# Patient Record
Sex: FEMALE | Race: WHITE | NOT HISPANIC OR LATINO | URBAN - METROPOLITAN AREA
[De-identification: names, ages, dates, MRNs, and addresses within clinical notes are randomized per-mention and may not be internally consistent; named-entity substitution may affect disease eponyms.]

---

## 2023-04-26 ENCOUNTER — WEB ENCOUNTER (OUTPATIENT)
Dept: URBAN - METROPOLITAN AREA SURGERY CENTER 31 | Facility: SURGERY CENTER | Age: 58
End: 2023-04-26

## 2023-05-02 ENCOUNTER — OFFICE VISIT (OUTPATIENT)
Dept: URBAN - METROPOLITAN AREA SURGERY CENTER 31 | Facility: SURGERY CENTER | Age: 58
End: 2023-05-02
Payer: COMMERCIAL

## 2023-05-02 DIAGNOSIS — Z86.010 COLON POLYP HISTORY: ICD-10-CM

## 2023-05-02 PROCEDURE — G0105 COLORECTAL SCRN; HI RISK IND: HCPCS | Performed by: INTERNAL MEDICINE

## 2023-05-02 PROCEDURE — G8907 PT DOC NO EVENTS ON DISCHARG: HCPCS | Performed by: INTERNAL MEDICINE

## 2023-08-30 ENCOUNTER — WEB ENCOUNTER (OUTPATIENT)
Dept: URBAN - METROPOLITAN AREA CLINIC 19 | Facility: CLINIC | Age: 58
End: 2023-08-30

## 2023-08-30 ENCOUNTER — OFFICE VISIT (OUTPATIENT)
Dept: URBAN - METROPOLITAN AREA CLINIC 19 | Facility: CLINIC | Age: 58
End: 2023-08-30
Payer: COMMERCIAL

## 2023-08-30 ENCOUNTER — LAB OUTSIDE AN ENCOUNTER (OUTPATIENT)
Dept: URBAN - METROPOLITAN AREA CLINIC 19 | Facility: CLINIC | Age: 58
End: 2023-08-30

## 2023-08-30 VITALS
SYSTOLIC BLOOD PRESSURE: 148 MMHG | HEART RATE: 60 BPM | BODY MASS INDEX: 33.31 KG/M2 | DIASTOLIC BLOOD PRESSURE: 67 MMHG | HEIGHT: 63 IN | OXYGEN SATURATION: 100 % | WEIGHT: 188 LBS | TEMPERATURE: 98.4 F

## 2023-08-30 DIAGNOSIS — R49.0 HOARSENESS: ICD-10-CM

## 2023-08-30 DIAGNOSIS — R09.89 GLOBUS SENSATION: ICD-10-CM

## 2023-08-30 DIAGNOSIS — R13.19 ESOPHAGEAL DYSPHAGIA: ICD-10-CM

## 2023-08-30 DIAGNOSIS — R05.3 CHRONIC COUGH: ICD-10-CM

## 2023-08-30 PROCEDURE — 99213 OFFICE O/P EST LOW 20 MIN: CPT | Performed by: NURSE PRACTITIONER

## 2023-08-30 RX ORDER — TIRZEPATIDE 7.5 MG/.5ML
AS DIRECTED INJECTION, SOLUTION SUBCUTANEOUS
Status: ACTIVE | COMMUNITY

## 2023-08-30 NOTE — HPI-TODAY'S VISIT:
Ms. Flower is a 59 yo female with PMH of IDDM, HTN/HLD, colon polyps who presents today for c/o dysphagia. Sent upon referral from Dr. Migdalia Huffman.  A copy of this report will be sent to the referring provider.   Onset 3 months ago. Feeling like food stuck in her upper esophagus. Solids and liquids both. Reports she constantly feels as if something is there even without eating. Reports some chronic coughing and hoarseness. Denies reflux, only occasional heartburn/indigestion. No abdominal pain.  She states Dr. Huffman started her on Pantoprazole but she has not really started taking it.  No changes in bowel habits. She is on Monjouro which causes her occasional constipation but this is relieved with a stool softener. No bloody or black stools.   Not on any prescribed blood thinners. No pacemaker or stents. No pulmonary or kidney disease...  Prior procedures: Colonoscopy done by Dr. Lynne 5/2/2023-entire colon appeared normal.  5-year follow-up given

## 2023-10-16 ENCOUNTER — OFFICE VISIT (OUTPATIENT)
Dept: URBAN - METROPOLITAN AREA CLINIC 19 | Facility: CLINIC | Age: 58
End: 2023-10-16

## 2023-10-17 ENCOUNTER — OFFICE VISIT (OUTPATIENT)
Dept: URBAN - METROPOLITAN AREA CLINIC 19 | Facility: CLINIC | Age: 58
End: 2023-10-17

## 2023-11-03 ENCOUNTER — OFFICE VISIT (OUTPATIENT)
Dept: URBAN - METROPOLITAN AREA SURGERY CENTER 31 | Facility: SURGERY CENTER | Age: 58
End: 2023-11-03
Payer: COMMERCIAL

## 2023-11-03 ENCOUNTER — TELEPHONE ENCOUNTER (OUTPATIENT)
Dept: URBAN - METROPOLITAN AREA CLINIC 19 | Facility: CLINIC | Age: 58
End: 2023-11-03

## 2023-11-03 ENCOUNTER — LAB OUTSIDE AN ENCOUNTER (OUTPATIENT)
Dept: URBAN - METROPOLITAN AREA CLINIC 19 | Facility: CLINIC | Age: 58
End: 2023-11-03

## 2023-11-03 DIAGNOSIS — K63.5 BENIGN COLONIC POLYP: ICD-10-CM

## 2023-11-03 DIAGNOSIS — R13.10 ABNORMAL DEGLUTITION: ICD-10-CM

## 2023-11-03 DIAGNOSIS — R09.89 GLOBUS SENSATION: ICD-10-CM

## 2023-11-03 DIAGNOSIS — Z12.11 COLON CANCER SCREENING (HIGH RISK): ICD-10-CM

## 2023-11-03 DIAGNOSIS — D13.0 BENIGN NEOPLASM OF CERVICAL ESOPHAGUS: ICD-10-CM

## 2023-11-03 DIAGNOSIS — B37.81 CANDIDA: ICD-10-CM

## 2023-11-03 DIAGNOSIS — R13.10 DYSPHAGIA: ICD-10-CM

## 2023-11-03 PROCEDURE — 43239 EGD BIOPSY SINGLE/MULTIPLE: CPT | Performed by: INTERNAL MEDICINE

## 2023-11-03 PROCEDURE — 00731 ANES UPR GI NDSC PX NOS: CPT | Performed by: NURSE ANESTHETIST, CERTIFIED REGISTERED

## 2023-11-03 PROCEDURE — G8907 PT DOC NO EVENTS ON DISCHARG: HCPCS | Performed by: INTERNAL MEDICINE

## 2023-11-03 RX ORDER — FLUCONAZOLE 200 MG/1
1 TABLET TABLET ORAL DAILY
Qty: 14 TABLET | Refills: 0 | OUTPATIENT
Start: 2023-11-03 | End: 2023-11-16

## 2023-11-03 RX ORDER — TIRZEPATIDE 7.5 MG/.5ML
AS DIRECTED INJECTION, SOLUTION SUBCUTANEOUS
Status: ACTIVE | COMMUNITY

## 2023-11-06 ENCOUNTER — TELEPHONE ENCOUNTER (OUTPATIENT)
Dept: URBAN - METROPOLITAN AREA CLINIC 19 | Facility: CLINIC | Age: 58
End: 2023-11-06

## 2023-11-17 ENCOUNTER — OFFICE VISIT (OUTPATIENT)
Dept: URBAN - METROPOLITAN AREA CLINIC 19 | Facility: CLINIC | Age: 58
End: 2023-11-17

## 2023-11-17 RX ORDER — TIRZEPATIDE 7.5 MG/.5ML
AS DIRECTED INJECTION, SOLUTION SUBCUTANEOUS
Status: ACTIVE | COMMUNITY

## 2023-11-24 ENCOUNTER — LAB OUTSIDE AN ENCOUNTER (OUTPATIENT)
Dept: URBAN - METROPOLITAN AREA CLINIC 19 | Facility: CLINIC | Age: 58
End: 2023-11-24

## 2023-11-27 ENCOUNTER — CLAIMS CREATED FROM THE CLAIM WINDOW (OUTPATIENT)
Dept: URBAN - METROPOLITAN AREA CLINIC 19 | Facility: CLINIC | Age: 58
End: 2023-11-27
Payer: COMMERCIAL

## 2023-11-27 ENCOUNTER — LAB OUTSIDE AN ENCOUNTER (OUTPATIENT)
Dept: URBAN - METROPOLITAN AREA CLINIC 19 | Facility: CLINIC | Age: 58
End: 2023-11-27

## 2023-11-27 VITALS
DIASTOLIC BLOOD PRESSURE: 66 MMHG | TEMPERATURE: 97.9 F | HEIGHT: 63 IN | BODY MASS INDEX: 30.65 KG/M2 | WEIGHT: 173 LBS | SYSTOLIC BLOOD PRESSURE: 136 MMHG | HEART RATE: 78 BPM

## 2023-11-27 DIAGNOSIS — R10.11 RUQ PAIN: ICD-10-CM

## 2023-11-27 DIAGNOSIS — R13.12 OROPHARYNGEAL DYSPHAGIA: ICD-10-CM

## 2023-11-27 PROCEDURE — 99214 OFFICE O/P EST MOD 30 MIN: CPT | Performed by: NURSE PRACTITIONER

## 2023-11-27 RX ORDER — TIRZEPATIDE 7.5 MG/.5ML
AS DIRECTED INJECTION, SOLUTION SUBCUTANEOUS
Status: ACTIVE | COMMUNITY

## 2023-11-27 NOTE — HPI-TODAY'S VISIT:
Ms. Flower is a 57 yo female with PMH of IDDM, HTN/HLD, colon polyps who presents today for follow-up. Last seen in GI clinic 8/30/2023 for complaints of dysphagia after being sent upon referral from Dr. Migdalia Huffman.   Onset of symptoms 3 months ago. Feeling like food stuck in her upper esophagus. Solids and liquids both. Reported globus sensation with some chronic coughing and hoarseness. Denied reflux, only occasional heartburn/indigestion. No abdominal pain. Reported Dr. Huffman started her on Pantoprazole but she did not take it. Reported being on Monjouro which causes her occasional constipation which is relieved with a stool softener.       EGD was done by Dr. Lynne on 11/3/2023-esophageal plaques were found suspicious for candidiasis, esophageal polyp 3 mm was found, large amount of food residue in the stomach therefore dilation not attempted, normal duodenum Diflucan 200 mg daily for 2 weeks prescribed Path from proximal esophagus biopsy showed slight chronic inflammation and Candida.  Esophagus at 20 cm polypectomy was inflamed squamous papilloma negative for intestinal metaplasia or fungal elements; no eosinophils 11/24/2023 GES was normal  States she tried to go off Mounjouro for a week but her BG were very out of control so she got back on it.  She reports later that night she ate 2 small chicken wings and then in middle of the night felt like she was having a heart attack so went to the ER at [facility]. US was negative. CBC unremarkable. ALT 43, AST 48. Lipase normal 58.       Prior procedures:        Colonoscopy done by Dr. Lynne 5/2/2023-entire colon appeared normal. 5-year follow-up given..

## 2023-12-05 ENCOUNTER — TELEPHONE ENCOUNTER (OUTPATIENT)
Dept: URBAN - METROPOLITAN AREA CLINIC 19 | Facility: CLINIC | Age: 58
End: 2023-12-05

## 2023-12-18 ENCOUNTER — CLAIMS CREATED FROM THE CLAIM WINDOW (OUTPATIENT)
Dept: URBAN - METROPOLITAN AREA CLINIC 19 | Facility: CLINIC | Age: 58
End: 2023-12-18
Payer: COMMERCIAL

## 2023-12-18 ENCOUNTER — TELEPHONE ENCOUNTER (OUTPATIENT)
Dept: URBAN - METROPOLITAN AREA CLINIC 19 | Facility: CLINIC | Age: 58
End: 2023-12-18

## 2023-12-18 ENCOUNTER — DASHBOARD ENCOUNTERS (OUTPATIENT)
Age: 58
End: 2023-12-18

## 2023-12-18 VITALS
DIASTOLIC BLOOD PRESSURE: 84 MMHG | SYSTOLIC BLOOD PRESSURE: 132 MMHG | HEIGHT: 63 IN | OXYGEN SATURATION: 98 % | BODY MASS INDEX: 30.3 KG/M2 | TEMPERATURE: 97.5 F | WEIGHT: 171 LBS | HEART RATE: 83 BPM

## 2023-12-18 DIAGNOSIS — R10.11 RUQ PAIN: ICD-10-CM

## 2023-12-18 DIAGNOSIS — R13.12 OROPHARYNGEAL DYSPHAGIA: ICD-10-CM

## 2023-12-18 PROCEDURE — 99213 OFFICE O/P EST LOW 20 MIN: CPT | Performed by: NURSE PRACTITIONER

## 2023-12-18 RX ORDER — TIRZEPATIDE 7.5 MG/.5ML
AS DIRECTED INJECTION, SOLUTION SUBCUTANEOUS
Status: ACTIVE | COMMUNITY

## 2023-12-18 NOTE — HPI-TODAY'S VISIT:
Ms. Flower is a 59 yo female with PMH of IDDM, HTN/HLD, colon polyps who presents today for follow-up.   She was first seen in GI clinic 8/30/2023 for complaints of dysphagia after being sent upon referral from Dr. Migdalia Huffman. Onset of symptoms 3 months prior. Reported feeling like food stuck in her upper esophagus. Solids and liquids both. Reported globus sensation with some chronic coughing and hoarseness. Denied reflux, only occasional heartburn/indigestion. No abdominal pain. Reported Dr. Huffman started her on Pantoprazole but she did not take it. Reported being on Monjouro which causes her occasional constipation which is relieved with a stool softener.       11/3/2023 EGD done by Dr. Lynne showed esophageal plaques suspicious for candidiasis, esophageal polyp 3 mm was found, large amount of food residue in the stomach therefore dilation not attempted, normal duodenum. Path from proximal esophagus biopsy showed slight chronic inflammation and Candida.  Esophagus at 20 cm polypectomy was inflamed squamous papilloma negative for intestinal metaplasia or fungal elements; no eosinophils.  Diflucan 200 mg daily for 2 weeks prescribed  11/24/2023 GES was normal   States she tried to go off Mounjouro for a week but her BG were very out of control so she got back on it.  She reports later that night she ate 2 small chicken wings and then in middle of the night felt like she was having a heart attack so went to the ER at [facility]. US was negative. CBC unremarkable. ALT 43, AST 48. Lipase normal 58.  She was seen again here 11/27/2023 complaining of RUQ pain and ongoing dysphagia HIDA scan ordered  12/13/2023 HIDA - no cystic or common bile duct obstruction but GBEF 3%  Today, she reports feeling quite well. States after she completed Diflucan script most of her upper GI issues and RUQ pain resolved. Still with some oropharyngeal dysphagia. She states she has been eating things like pizza and tolerating fine.      --------------------------------     Prior procedures:        Colonoscopy done by Dr. Lynne 5/2/2023-entire colon appeared normal. 5-year follow-up given.

## 2024-01-19 ENCOUNTER — LAB OUTSIDE AN ENCOUNTER (OUTPATIENT)
Dept: URBAN - METROPOLITAN AREA CLINIC 19 | Facility: CLINIC | Age: 59
End: 2024-01-19

## 2024-01-23 ENCOUNTER — TELEPHONE ENCOUNTER (OUTPATIENT)
Dept: URBAN - METROPOLITAN AREA CLINIC 19 | Facility: CLINIC | Age: 59
End: 2024-01-23